# Patient Record
Sex: MALE | Race: WHITE | HISPANIC OR LATINO | Employment: UNEMPLOYED | URBAN - METROPOLITAN AREA
[De-identification: names, ages, dates, MRNs, and addresses within clinical notes are randomized per-mention and may not be internally consistent; named-entity substitution may affect disease eponyms.]

---
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
## 2024-01-25 ENCOUNTER — NURSE TRIAGE (OUTPATIENT)
Dept: OTHER | Facility: OTHER | Age: 19
End: 2024-01-25

## 2024-01-25 NOTE — TELEPHONE ENCOUNTER
"Were you within 6 feet or less, for up to 15 minutes or more with a person that has a confirmed COVID-19 test? Positive today  What was the date of your exposure? No exposure  Are you experiencing any symptoms attributed to the virus?  (Assess for SOB, cough, fever, difficulty breathing) Fatigue, cough, chest congestion , no fever.  HIGH RISK: Do you have any history heart or lung conditions, weakened immune system, diabetes, Asthma, CHF, HIV, COPD, Chemo, renal failure, sickle cell, etc? None  VACCINE: \"Have you gotten the COVID-19 vaccine?\" If Yes ask: \"Which one, how many shots, when did you get it?\" 3  immunizations.  Reason for Disposition  • [1] COVID-19 diagnosed by positive lab test (e.g., PCR, rapid self-test kit) AND [2] mild symptoms (e.g., cough, fever, others) AND [3] no complications or SOB    Protocols used: Coronavirus (COVID-19) Diagnosed or Suspected-ADULT-    "

## 2024-01-25 NOTE — TELEPHONE ENCOUNTER
"Regarding: congestion / cough / covid+  ----- Message from Meghan Jones sent at 1/25/2024  6:24 PM EST -----  \"My son has congestion, headache, earache, a cough hand stomach pains. He also just tested COVID+\"    "

## 2024-02-09 ENCOUNTER — OFFICE VISIT (OUTPATIENT)
Dept: FAMILY MEDICINE CLINIC | Facility: CLINIC | Age: 19
End: 2024-02-09
Payer: COMMERCIAL

## 2024-02-09 ENCOUNTER — HOSPITAL ENCOUNTER (EMERGENCY)
Facility: HOSPITAL | Age: 19
Discharge: HOME | End: 2024-02-09
Attending: EMERGENCY MEDICINE
Payer: COMMERCIAL

## 2024-02-09 VITALS
HEART RATE: 81 BPM | TEMPERATURE: 98.1 F | RESPIRATION RATE: 18 BRPM | DIASTOLIC BLOOD PRESSURE: 55 MMHG | SYSTOLIC BLOOD PRESSURE: 113 MMHG | OXYGEN SATURATION: 100 %

## 2024-02-09 VITALS
HEIGHT: 66 IN | OXYGEN SATURATION: 97 % | WEIGHT: 185.2 LBS | HEART RATE: 75 BPM | SYSTOLIC BLOOD PRESSURE: 110 MMHG | DIASTOLIC BLOOD PRESSURE: 82 MMHG | BODY MASS INDEX: 29.77 KG/M2 | TEMPERATURE: 97.8 F

## 2024-02-09 DIAGNOSIS — R55 VASOVAGAL SYNCOPE: Primary | ICD-10-CM

## 2024-02-09 LAB
ALBUMIN SERPL-MCNC: 4.7 G/DL (ref 3.6–5.1)
ALBUMIN/GLOB SERPL: 1.5 (CALC) (ref 1–2.5)
ALP SERPL-CCNC: 86 U/L (ref 46–169)
ALT SERPL-CCNC: 23 U/L (ref 8–46)
AST SERPL-CCNC: 33 U/L (ref 12–32)
ATRIAL RATE: 64 BPM
BASOPHILS # BLD AUTO: 37 CELLS/UL (ref 0–200)
BASOPHILS NFR BLD AUTO: 0.4 %
BILIRUB SERPL-MCNC: 1.4 MG/DL (ref 0.2–1.1)
BUN SERPL-MCNC: 21 MG/DL (ref 7–20)
BUN/CREAT SERPL: 25 (CALC) (ref 6–22)
CALCIUM SERPL-MCNC: 9.9 MG/DL (ref 8.9–10.4)
CHLORIDE SERPL-SCNC: 101 MMOL/L (ref 98–110)
CO2 SERPL-SCNC: 26 MMOL/L (ref 20–32)
CREAT SERPL-MCNC: 0.85 MG/DL (ref 0.6–1.24)
EOSINOPHIL # BLD AUTO: 28 CELLS/UL (ref 15–500)
EOSINOPHIL NFR BLD AUTO: 0.3 %
ERYTHROCYTE [DISTWIDTH] IN BLOOD BY AUTOMATED COUNT: 12.7 % (ref 11–15)
GFR/BSA.PRED SERPLBLD CYS-BASED-ARV: 128 ML/MIN/1.73M2
GLOBULIN SER CALC-MCNC: 3.2 G/DL (CALC) (ref 2.1–3.5)
GLUCOSE SERPL-MCNC: 89 MG/DL (ref 65–99)
HCT VFR BLD AUTO: 39.9 % (ref 38.5–50)
HGB BLD-MCNC: 13.8 G/DL (ref 13.2–17.1)
LYMPHOCYTES # BLD AUTO: 2769 CELLS/UL (ref 850–3900)
LYMPHOCYTES NFR BLD AUTO: 30.1 %
MCH RBC QN AUTO: 30.3 PG (ref 27–33)
MCHC RBC AUTO-ENTMCNC: 34.6 G/DL (ref 32–36)
MCV RBC AUTO: 87.5 FL (ref 80–100)
MONOCYTES # BLD AUTO: 607 CELLS/UL (ref 200–950)
MONOCYTES NFR BLD AUTO: 6.6 %
NEUTROPHILS # BLD AUTO: 5759 CELLS/UL (ref 1500–7800)
NEUTROPHILS NFR BLD AUTO: 62.6 %
P AXIS: 17 DEGREES
PLATELET # BLD AUTO: 276 THOUSAND/UL (ref 140–400)
PMV BLD REES-ECKER: 10.9 FL (ref 7.5–12.5)
POTASSIUM SERPL-SCNC: 4.9 MMOL/L (ref 3.8–5.1)
PR INTERVAL: 166 MS
PROT SERPL-MCNC: 7.9 G/DL (ref 6.3–8.2)
QRS AXIS: 43 DEGREES
QRSD INTERVAL: 82 MS
QT INTERVAL: 392 MS
QTC INTERVAL: 404 MS
RBC # BLD AUTO: 4.56 MILLION/UL (ref 4.2–5.8)
SODIUM SERPL-SCNC: 136 MMOL/L (ref 135–146)
T WAVE AXIS: 27 DEGREES
VENTRICULAR RATE: 64 BPM
WBC # BLD AUTO: 9.2 THOUSAND/UL (ref 3.8–10.8)

## 2024-02-09 PROCEDURE — 93005 ELECTROCARDIOGRAM TRACING: CPT

## 2024-02-09 PROCEDURE — 99213 OFFICE O/P EST LOW 20 MIN: CPT | Performed by: FAMILY MEDICINE

## 2024-02-09 PROCEDURE — 99283 EMERGENCY DEPT VISIT LOW MDM: CPT

## 2024-02-09 PROCEDURE — 99284 EMERGENCY DEPT VISIT MOD MDM: CPT | Performed by: EMERGENCY MEDICINE

## 2024-02-09 NOTE — ED ATTENDING ATTESTATION
2/9/2024  I, Joseph Pacheco MD, saw and evaluated the patient. I have discussed the patient with the resident/non-physician practitioner and agree with the resident's/non-physician practitioner's findings, Plan of Care, and MDM as documented in the resident's/non-physician practitioner's note, except where noted. All available labs and Radiology studies were reviewed.  I was present for key portions of any procedure(s) performed by the resident/non-physician practitioner and I was immediately available to provide assistance.       At this point I agree with the current assessment done in the Emergency Department.  I have conducted an independent evaluation of this patient a history and physical is as follows:    ED Course     Emergency Department Note- Dawson Mckay 18 y.o. male MRN: [id number]    Unit/Bed#: ED 23 Encounter: [id number]    Dawson Mckay is a 18 y.o. male who presents with   Chief Complaint   Patient presents with    Syncope     Patient reports syncopal episode that lasted approx 30 sec, patient states he still feels dizzy but not as bad.  Father reports patient hitting head on kitchen table, denies headache at this time         History of Present Illness   HPI:  Dawson Mckay is a 18 y.o. male who presents for evaluation of:  Syncopal episode that lasted for about 30 seconds at home.  The patient notes that he woke up abruptly to check on his father who was having a coughing episode.  The patient fell hitting his head on the kitchen table.  He does not take any anticoagulation or antiplatelet medications.  He denies headache, nausea, vomiting.  He has no history of seizure or neurologic disease or of cardiac disease.  He denies any associated chest pain and palpitations.    Review of Systems   Constitutional:  Negative for fatigue and fever.   HENT:  Negative for congestion and sore throat.    Respiratory:  Negative for cough and shortness of breath.    Cardiovascular:  Negative  for chest pain and palpitations.   Gastrointestinal:  Negative for abdominal pain and nausea.   Genitourinary:  Negative for flank pain and frequency.   Neurological:  Positive for syncope. Negative for light-headedness and headaches.   Psychiatric/Behavioral:  Negative for dysphoric mood and hallucinations.    All other systems reviewed and are negative.      Historical Information   Past Medical History:   Diagnosis Date    GERD (gastroesophageal reflux disease)      History reviewed. No pertinent surgical history.  Social History   Social History     Substance and Sexual Activity   Alcohol Use No     Social History     Substance and Sexual Activity   Drug Use No     Social History     Tobacco Use   Smoking Status Never   Smokeless Tobacco Never     Family History:   Family History   Problem Relation Age of Onset    Hypothyroidism Father     Asthma Family        Meds/Allergies   PTA meds:   None     Allergies   Allergen Reactions    Antihistamines, Chlorpheniramine-Type Rash     Patient can take benadryl        Objective   First Vitals:   Blood Pressure: 130/74 (02/09/24 0406)  Pulse: 76 (02/09/24 0406)  Temperature: 98.1 °F (36.7 °C) (02/09/24 0406)  Temp Source: Oral (02/09/24 0406)  Respirations: 18 (02/09/24 0406)  SpO2: 100 % (02/09/24 0406)    Current Vitals:   Blood Pressure: 130/74 (02/09/24 0406)  Pulse: 76 (02/09/24 0406)  Temperature: 98.1 °F (36.7 °C) (02/09/24 0406)  Temp Source: Oral (02/09/24 0406)  Respirations: 18 (02/09/24 0406)  SpO2: 100 % (02/09/24 0406)    No intake or output data in the 24 hours ending 02/09/24 0456    Invasive Devices       None                   Physical Exam  Vitals and nursing note reviewed.   Constitutional:       General: He is not in acute distress.     Appearance: Normal appearance. He is well-developed.   HENT:      Head: Normocephalic and atraumatic.      Right Ear: External ear normal.      Left Ear: External ear normal.      Nose: Nose normal.      Mouth/Throat:  "     Pharynx: No oropharyngeal exudate.   Eyes:      Conjunctiva/sclera: Conjunctivae normal.      Pupils: Pupils are equal, round, and reactive to light.   Cardiovascular:      Rate and Rhythm: Normal rate and regular rhythm.   Pulmonary:      Effort: Pulmonary effort is normal. No respiratory distress.   Abdominal:      General: Abdomen is flat. There is no distension.      Palpations: Abdomen is soft.   Musculoskeletal:         General: No deformity. Normal range of motion.      Cervical back: Normal range of motion and neck supple.   Skin:     General: Skin is warm and dry.      Capillary Refill: Capillary refill takes less than 2 seconds.   Neurological:      General: No focal deficit present.      Mental Status: He is alert and oriented to person, place, and time. Mental status is at baseline.      Coordination: Coordination normal.   Psychiatric:         Mood and Affect: Mood normal.         Behavior: Behavior normal.         Thought Content: Thought content normal.         Judgment: Judgment normal.           Medical Decision Makin.  Neurocardiogenic syncope: ECG rule out arrhythmia; follow-up with PCP as an outpatient.    Recent Results (from the past 36 hour(s))   ECG 12 lead    Collection Time: 24  4:19 AM   Result Value Ref Range    Ventricular Rate 64 BPM    Atrial Rate 64 BPM    HI Interval 166 ms    QRSD Interval 82 ms    QT Interval 392 ms    QTC Interval 404 ms    P [place] 17 degrees    QRS Axis 43 degrees    T Wave Axis 27 degrees     No orders to display         Portions of the record may have been created with voice recognition software. Occasional wrong word or \"sound a like\" substitutions may have occurred due to the inherent limitations of voice recognition software.  Read the chart carefully and recognize, using context, where substitutions have occurred.        Critical Care Time  Procedures      "

## 2024-02-09 NOTE — PROGRESS NOTES
FAMILY PRACTICE OFFICE VISIT  Froy Henry D.O.    [facility]  [facility]  [address] [place] [address]  [address]  [place], [address]      NAME: Dawson Mckay  AGE: 18 y.o. SEX: male  : 2005   MRN: [id number]    DATE: 2024  TIME: 10:54 AM    Assessment and Plan     Problem List Items Addressed This Visit    None  Visit Diagnoses       Vasovagal syncope    -  Primary    Relevant Orders    CBC    Comprehensive metabolic panel            Patient Instructions   Had vasovagal episode overnight, witnessed syncope, struck occipital parietal.  Looks fine here today.  He will do CBC, CMP for completeness, did have blood work back in .  No prior issues with syncope.  EKG was run at emergency room, that was fine.    Does exercise routinely, has been watching his diet, has dropped about 35 to 40 pounds in the past year, make sure to keep hydrated, avoid extended fast.  Has been using creatine, I want him to stop that, avoid supplements at the gym.    If does note further issues he should call.    Chief Complaint     Chief Complaint   Patient presents with    Syncope       History of Present Illness   Dawson Mckay is a 18 y.o.-year-old male who is in accompanied by his dad for a recheck, we reviewed her emergency room visit from earlier this morning, had syncopal episode which was witnessed by his dad, no prior episodes.  Feels fine now      Review of Systems   Review of Systems   Constitutional:  Negative for appetite change, fatigue, fever and unexpected weight change (has lost wt w diet and exercise).   HENT:  Negative for sore throat and trouble swallowing.    Respiratory:  Negative for cough, chest tightness and shortness of breath.    Cardiovascular:  Negative for chest pain, palpitations and leg swelling.   Gastrointestinal:  Negative for abdominal pain, blood in stool, diarrhea, nausea and vomiting.        No acid reflux     No change in bowel   Genitourinary:   "Negative for dysuria and hematuria.   Neurological:  Positive for dizziness, syncope and light-headedness. Negative for headaches.   Hematological:  Does not bruise/bleed easily.   Psychiatric/Behavioral:  Negative for behavioral problems, confusion and sleep disturbance.        Active Problem List     Patient Active Problem List   Diagnosis    Attention and concentration deficit    Exercise-induced shortness of breath    Childhood obesity, BMI  percentile       Past Medical History:  Reviewed    Past Surgical History:  Reviewed    Family History:  Reviewed    Social History:  Reviewed    Objective     Vitals:    02/09/24 0848   BP: 110/82   BP Location: Left arm   Patient Position: Sitting   Cuff Size: Large   Pulse: 75   Temp: 97.8 °F (36.6 °C)   TempSrc: Tympanic   SpO2: 97%   Weight: 84 kg (185 lb 3.2 oz)   Height: 5' 6\" (1.676 m)     Body mass index is 29.89 kg/m².    BP Readings from Last 3 Encounters:   02/09/24 110/82   02/09/24 113/55   07/06/22 110/78 (29%, Z = -0.55 /  88%, Z = 1.17)*     *BP percentiles are based on the 2017 AAP Clinical Practice Guideline for boys       Wt Readings from Last 3 Encounters:   02/09/24 84 kg (185 lb 3.2 oz) (85%, Z= 1.05)*   07/06/22 102 kg (224 lb 6.4 oz) (98%, Z= 2.08)*   09/11/21 102 kg (225 lb 12 oz) (99%, Z= 2.22)*     * Growth percentiles are based on [facility] (Boys, 2-20 Years) data.       Physical Exam  Constitutional:       General: He is not in acute distress.     Appearance: Normal appearance. He is well-developed. He is not ill-appearing.   HENT:      Right Ear: Tympanic membrane normal.      Left Ear: Tympanic membrane normal.      Mouth/Throat:      Pharynx: Oropharynx is clear.   Eyes:      General: No scleral icterus.  Cardiovascular:      Rate and Rhythm: Normal rate and regular rhythm.      Heart sounds: Normal heart sounds. No murmur heard.  Pulmonary:      Effort: Pulmonary effort is normal. No respiratory distress.      Breath sounds: Normal breath " sounds. No wheezing, rhonchi or rales.   Abdominal:      Palpations: Abdomen is soft.      Tenderness: There is no abdominal tenderness.   Musculoskeletal:      Right lower leg: No edema.      Left lower leg: No edema.   Lymphadenopathy:      Cervical: No cervical adenopathy.   Neurological:      General: No focal deficit present.      Mental Status: He is alert and oriented to person, place, and time.   Psychiatric:         Mood and Affect: Mood normal.         Behavior: Behavior normal.         ALLERGIES:  Allergies   Allergen Reactions    Antihistamines, Chlorpheniramine-Type Rash     Patient can take benadryl        Current Medications     No current outpatient medications on file.     No current facility-administered medications for this visit.            Orders Placed This Encounter   Procedures    CBC    Comprehensive metabolic panel         Froy Henry DO

## 2024-02-09 NOTE — DISCHARGE INSTRUCTIONS
Please return to the emergency department if you develop new or worsening symptoms including fever, chest pain, shortness of breath, nausea and vomiting that does not resolve on its own, change in/ loss of vision, new onset worsening headache.    Please follow-up with your primary care provider for additional care and management of your recent syncopal event, please continue to follow w/ your family doctor for basic laboratory work regarding questions of nutrition status secondary to dieting.    Please continue to take your home medications as prescribed, please continue to hydrate well.

## 2024-02-09 NOTE — PATIENT INSTRUCTIONS
Had vasovagal episode overnight, witnessed syncope, struck occipital parietal.  Looks fine here today.  He will do CBC, CMP for completeness, did have blood work back in 2022.  No prior issues with syncope.  EKG was run at emergency room, that was fine.    Does exercise routinely, has been watching his diet, has dropped about 35 to 40 pounds in the past year, make sure to keep hydrated, avoid extended fast.  Has been using creatine, I want him to stop that, avoid supplements at the gym.    If does note further issues he should call.

## 2024-02-09 NOTE — ED PROVIDER NOTES
History  Chief Complaint   Patient presents with    Syncope     Patient reports syncopal episode that lasted approx 30 sec, patient states he still feels dizzy but not as bad.  Father reports patient hitting head on kitchen table, denies headache at this time     Patient is a 18-year-old male with no pertinent past medical history who presents emergency department for a syncopal episode while at home.  Patient reports that he awoke shortly before arrival due to his father having a acute coughing fit, rapidly got out of bed to check on his father, after checking on his father began to feel a flushed/warm sensation, felt his heart racing, went to the kitchen to get some water, while in the kitchen he passed out, struck his head, father was in the room, saw the event, patient was passed out for about 30 seconds, stated that he may be shook while he was on the floor, after about 30 seconds he came to, asked where he was most going on, and during this interval the father took her blood pressure, was low approximately 70/50, due to this concern they decided to come to the emergency department.     Patient is reporting no blood thinners, no history of cardiac illness, no family history of epilepsy, no recent fevers, no viral symptoms, no chest pain, no shortness of breathing, no loss of vision, no new onset weakness or change in sensation, ambulatory after the event, no history of prior events like this, no nausea, no vomiting.        None       Past Medical History:   Diagnosis Date    GERD (gastroesophageal reflux disease)        History reviewed. No pertinent surgical history.    Family History   Problem Relation Age of Onset    Hypothyroidism Father     Asthma Family      I have reviewed and agree with the history as documented.    E-Cigarette/Vaping    E-Cigarette Use Never User      E-Cigarette/Vaping Substances     Social History     Tobacco Use    Smoking status: Never    Smokeless tobacco: Never   Vaping Use     Vaping status: Never Used   Substance Use Topics    Alcohol use: No    Drug use: No        Review of Systems    Physical Exam  ED Triage Vitals   Temperature Pulse Respirations Blood Pressure SpO2   02/09/24 0406 02/09/24 0406 02/09/24 0406 02/09/24 0406 02/09/24 0406   98.1 °F (36.7 °C) 76 18 130/74 100 %      Temp Source Heart Rate Source Patient Position - Orthostatic VS BP Location FiO2 (%)   02/09/24 0406 02/09/24 0406 02/09/24 0458 02/09/24 0458 --   Oral Monitor Standing - Orthostatic VS Left arm       Pain Score       02/09/24 0406       4             Orthostatic Vital Signs  Vitals:    02/09/24 0406 02/09/24 0458   BP: 130/74 113/55   Pulse: 76 81   Patient Position - Orthostatic VS:  Standing - Orthostatic VS       Physical Exam  Vitals and nursing note reviewed.   Constitutional:       General: He is not in acute distress.     Appearance: He is well-developed. He is not ill-appearing or toxic-appearing.   HENT:      Head: Normocephalic and atraumatic.   Eyes:      Extraocular Movements: Extraocular movements intact.      Conjunctiva/sclera: Conjunctivae normal.      Pupils: Pupils are equal, round, and reactive to light.   Cardiovascular:      Rate and Rhythm: Normal rate and regular rhythm.      Heart sounds: No murmur heard.  Pulmonary:      Effort: Pulmonary effort is normal. No respiratory distress.      Breath sounds: Normal breath sounds. No wheezing, rhonchi or rales.   Abdominal:      Palpations: Abdomen is soft.      Tenderness: There is no abdominal tenderness. There is no guarding or rebound.   Musculoskeletal:         General: No swelling, tenderness, deformity or signs of injury.      Cervical back: Neck supple.   Skin:     General: Skin is warm and dry.      Capillary Refill: Capillary refill takes less than 2 seconds.      Findings: No bruising, erythema, lesion or rash.      Comments: There is a small scalp hematoma over the occipital region, no evidence of open wound, no bleeding, minimal  if any tenderness to palpation   Neurological:      General: No focal deficit present.      Mental Status: He is alert and oriented to person, place, and time.      Sensory: No sensory deficit.   Psychiatric:         Mood and Affect: Mood normal.         ED Medications  Medications - No data to display    Diagnostic Studies  Results Reviewed       None                   No orders to display         Procedures  Procedures      ED Course                                       Medical Decision Making  Patient is a 18 y.o. male with PMH of nothing pertinent who presents to the ED with complaint of recent syncopal episode.    Vital signs on arrival within normal limits. On exam patient is seen in the bed, alert, oriented, speaking without difficulty, no focal neurological deficits, cardiopulmonary auscultation within normal is, abdomen is soft/nontender, breathing without difficulty, no pain complaint.    History and physical exam most consistent with neurocardiogenic syncope. However, differential diagnosis included but not limited to dehydration, arrhythmia. Plan EKG, oral rehydration, orthostatic vital signs.    View ED course above for further discussion on patient workup.     Patient given and juice/water, tolerating p.o. liquids and food.    All labs reviewed and utilized in the medical decision making process  All radiology studies independently viewed by me and interpreted by the radiologist.  I reviewed all testing with the patient.     Upon re-evaluation patient is seen in the room, alert, oriented, no additional symptoms/complaint, has continued to remain hemodynamically stable, orthostatic vital signs within normal limits, no drop in patient's blood pressure.     Plan for care discussed with pt, acknowledges plan for care, consents to plan for care, educated on symptoms concerning for return to the emergency department, feels safe to return home at this time, cleared for  discharge.            Disposition  Final diagnoses:   Vasovagal syncope     Time reflects when diagnosis was documented in both MDM as applicable and the Disposition within this note       Time User Action Codes Description Comment    2/9/2024  4:53 AM Clifton Garnica Add [R55] Vasovagal syncope           ED Disposition       ED Disposition   Discharge    Condition   Stable    Date/Time   Fri Feb 9, 2024  4:55 AM    Comment   Dawson Mckay discharge to home/self care.                   Follow-up Information       Follow up With Specialties Details Why Contact Yunior Del Rio DO Family Medicine Schedule an appointment as soon as possible for a visit in 1 week  [address] [place] [address]  [address]  [facility] [address] [phone]              There are no discharge medications for this patient.    No discharge procedures on file.    PDMP Review       None             ED Provider  Attending physically available and evaluated Dawson TAMIKO Mckay. I managed the patient along with the ED Attending.    Electronically Signed by           Clifton Garnica DO  02/09/24 0532       Clifton Garnica DO  02/09/24 0533

## 2024-11-18 ENCOUNTER — APPOINTMENT (OUTPATIENT)
Dept: RADIOLOGY | Facility: MEDICAL CENTER | Age: 19
End: 2024-11-18
Payer: OTHER MISCELLANEOUS

## 2024-11-18 ENCOUNTER — APPOINTMENT (OUTPATIENT)
Dept: URGENT CARE | Facility: MEDICAL CENTER | Age: 19
End: 2024-11-18
Payer: OTHER MISCELLANEOUS

## 2024-11-18 DIAGNOSIS — S89.92XA INJURY OF LEFT LOWER LEG, INITIAL ENCOUNTER: Primary | ICD-10-CM

## 2024-11-18 DIAGNOSIS — S89.92XA INJURY OF LEFT LOWER LEG, INITIAL ENCOUNTER: ICD-10-CM

## 2024-11-18 PROCEDURE — 99283 EMERGENCY DEPT VISIT LOW MDM: CPT

## 2024-11-18 PROCEDURE — 73590 X-RAY EXAM OF LOWER LEG: CPT

## 2024-11-18 PROCEDURE — G0382 LEV 3 HOSP TYPE B ED VISIT: HCPCS

## 2024-11-25 ENCOUNTER — APPOINTMENT (OUTPATIENT)
Dept: URGENT CARE | Facility: MEDICAL CENTER | Age: 19
End: 2024-11-25
Payer: OTHER MISCELLANEOUS

## 2024-11-25 PROCEDURE — 99213 OFFICE O/P EST LOW 20 MIN: CPT | Performed by: PHYSICIAN ASSISTANT

## 2025-05-26 ENCOUNTER — HOSPITAL ENCOUNTER (EMERGENCY)
Facility: HOSPITAL | Age: 20
Discharge: HOME | End: 2025-05-26
Attending: EMERGENCY MEDICINE | Admitting: EMERGENCY MEDICINE
Payer: COMMERCIAL

## 2025-05-26 ENCOUNTER — APPOINTMENT (EMERGENCY)
Dept: RADIOLOGY | Facility: HOSPITAL | Age: 20
End: 2025-05-26
Payer: COMMERCIAL

## 2025-05-26 VITALS
DIASTOLIC BLOOD PRESSURE: 77 MMHG | HEART RATE: 66 BPM | WEIGHT: 182.76 LBS | OXYGEN SATURATION: 100 % | SYSTOLIC BLOOD PRESSURE: 130 MMHG | RESPIRATION RATE: 18 BRPM | BODY MASS INDEX: 29.5 KG/M2

## 2025-05-26 DIAGNOSIS — S63.289A CLOSED TRAUMATIC DISLOCATION OF PROXIMAL INTERPHALANGEAL (PIP) JOINT OF FINGER OF RIGHT HAND: Primary | ICD-10-CM

## 2025-05-26 PROCEDURE — 26770 TREAT FINGER DISLOCATION: CPT

## 2025-05-26 PROCEDURE — 73140 X-RAY EXAM OF FINGER(S): CPT

## 2025-05-26 PROCEDURE — 99284 EMERGENCY DEPT VISIT MOD MDM: CPT

## 2025-05-26 PROCEDURE — 99283 EMERGENCY DEPT VISIT LOW MDM: CPT

## 2025-05-26 RX ORDER — LIDOCAINE HYDROCHLORIDE 10 MG/ML
5 INJECTION, SOLUTION EPIDURAL; INFILTRATION; INTRACAUDAL; PERINEURAL ONCE
Status: COMPLETED | OUTPATIENT
Start: 2025-05-26 | End: 2025-05-26

## 2025-05-26 RX ORDER — IBUPROFEN 400 MG/1
800 TABLET, FILM COATED ORAL ONCE
Status: COMPLETED | OUTPATIENT
Start: 2025-05-26 | End: 2025-05-26

## 2025-05-26 RX ADMIN — IBUPROFEN 800 MG: 400 TABLET, FILM COATED ORAL at 21:15

## 2025-05-26 RX ADMIN — LIDOCAINE HYDROCHLORIDE 5 ML: 10 INJECTION, SOLUTION EPIDURAL; INFILTRATION; INTRACAUDAL at 22:04

## 2025-05-27 NOTE — DISCHARGE INSTRUCTIONS
You had a dislocation of your pinky finger requiring reduction.  There is back in anatomic alignment.  I recommend that you keep aluminum splint in place for immobilization and follow-up with hand surgery to ensure your range of motion and other symptoms are improving.

## 2025-05-27 NOTE — ED PROVIDER NOTES
Time reflects when diagnosis was documented in both MDM as applicable and the Disposition within this note       Time User Action Codes Description Comment    5/26/2025 10:54 PM Juarez Lui Add [S63.289A] Closed traumatic dislocation of proximal interphalangeal (PIP) joint of finger of right hand           ED Disposition       ED Disposition   Discharge    Condition   Stable    Date/Time   Mon May 26, 2025 10:53 PM    Comment   Dawson Mckay discharge to home/self care.                   Assessment & Plan       Medical Decision Making  19-year-old male presents with injury to his right fifth finger that occurred a couple hours ago playing football.  Exam: Right fifth digit appears laterally displaced around the level of the PIP.  PIP dislocation confirmed with x-ray.  After discussion with patient regarding management, digital block was performed with lidocaine without epinephrine.  Subsequently performed reduction with successful anatomic realignment of middle and distal phalanx.  On postreduction x-ray confirms successful reduction, and also shows mild avulsion fracture involving the anterior finger around level of proximal middle phalanx.  On reevaluation patient feels improvement in his symptoms.  His finger now is able to flex and extend normally, active and passive, ROM just diminished due to swelling.  Cap refill less than 2 seconds.  Sensation decreased due to digital block.  Placed in aluminum splint.  Given referral to hand surgery for follow-up.  Patient expresses understanding of the condition, treatment plan, follow-up instructions, and return precautions.      Amount and/or Complexity of Data Reviewed  Radiology: ordered and independent interpretation performed.    Risk  Prescription drug management.             Medications   ibuprofen (MOTRIN) tablet 800 mg (800 mg Oral Given 5/26/25 2115)   lidocaine (PF) (XYLOCAINE-MPF) 1 % injection 5 mL (5 mL Infiltration Given by Other 5/26/25 2204)  "      ED Risk Strat Scores                    No data recorded                            History of Present Illness       Chief Complaint   Patient presents with    Finger Injury     Pt reports \"dislocated right pinky during a football game\" no meds pta.       Past Medical History[1]   Past Surgical History[2]   Family History[3]   Social History[4]   E-Cigarette/Vaping    E-Cigarette Use Never User       E-Cigarette/Vaping Substances      I have reviewed and agree with the history as documented.     19-year-old male presents with injury to his right fifth digit while playing football couple hours ago.  Significantly diminished ROM of affected finger as well as pain.  Sensation grossly intact.        Review of Systems   Constitutional:  Negative for chills and fever.   HENT:  Negative for ear pain and sore throat.    Eyes:  Negative for pain and visual disturbance.   Respiratory:  Negative for cough and shortness of breath.    Cardiovascular:  Negative for chest pain and palpitations.   Gastrointestinal:  Negative for abdominal pain and vomiting.   Genitourinary:  Negative for dysuria and hematuria.   Musculoskeletal:  Positive for arthralgias and joint swelling. Negative for back pain.   Skin:  Negative for color change and rash.   Neurological:  Negative for seizures and syncope.   All other systems reviewed and are negative.          Objective       ED Triage Vitals [05/26/25 2047]   Temp Pulse Blood Pressure Respirations SpO2 Patient Position - Orthostatic VS   -- 66 130/77 18 100 % Sitting      Temp src Heart Rate Source BP Location FiO2 (%) Pain Score    -- Monitor Left arm -- 8      Vitals      Date and Time Temp Pulse SpO2 Resp BP Pain Score FACES Pain Rating User   05/26/25 2303 -- -- -- -- -- 5 -- SH   05/26/25 2115 -- -- -- -- -- 9 -- AA   05/26/25 2047 -- 66 100 % 18 130/77 8 -- NM            Physical Exam  Vitals and nursing note reviewed.   Constitutional:       General: He is not in acute distress.   "   Appearance: He is well-developed.   HENT:      Head: Normocephalic and atraumatic.     Eyes:      Conjunctiva/sclera: Conjunctivae normal.       Cardiovascular:      Rate and Rhythm: Normal rate and regular rhythm.      Heart sounds: No murmur heard.  Pulmonary:      Effort: Pulmonary effort is normal. No respiratory distress.      Breath sounds: Normal breath sounds.   Abdominal:      Palpations: Abdomen is soft.      Tenderness: There is no abdominal tenderness.     Musculoskeletal:         General: No swelling.        Hands:       Cervical back: Neck supple.     Skin:     General: Skin is warm and dry.      Capillary Refill: Capillary refill takes less than 2 seconds.     Neurological:      Mental Status: He is alert.     Psychiatric:         Mood and Affect: Mood normal.         Results Reviewed       None            XR finger fifth digit-pinkie RIGHT   ED Interpretation by Juarez Lui PA-C (05/26 2253)   ED wet read: Successful reduction of fifth digit PIP now with anatomic alignment.  Likely small avulsion fracture involving anterior proximal middle phalanx.      XR finger fifth digit-pinkie RIGHT   ED Interpretation by Juarez Lui PA-C (05/26 2149)   ED wet read: Dislocation of fifth digit PIP.  No associated fracture.          Orthopedic injury treatment    Date/Time: 5/26/2025 10:00 PM    Performed by: Juarez Lui PA-C  Authorized by: Juarez Lui PA-C    Patient Location:  ED    [place] Protocol:  procedure performed by consultantConsent: Verbal consent obtained  Risks and benefits: risks, benefits and alternatives were discussed  Consent given by: patient  Patient understanding: patient states understanding of the procedure being performed  Patient identity confirmed: verbally with patient    Injury location:  Finger  Location details:  Right little finger  Injury type:  Dislocation  Dislocation type: PIP    Neurovascular status: Neurovascularly intact    Distal  perfusion: normal    Neurological function: normal    Range of motion: reduced    Local anesthesia used?: Yes    General anesthesia used?: No    Anesthesia:  Digital block  Local anesthetic:  Lidocaine 1% without epinephrine  Anesthetic total (ml):  3  Manipulation performed?: Yes    Skeletal traction used?: Yes    Reduction successful?: Yes    Confirmation: Reduction confirmed by x-ray    Immobilization:  Splint  Splint type:  Finger splint, static  Supplies used:  Aluminum splint  Distal perfusion: normal    Neurological function: diminished    Neurological function comment:  S/p digital block  Range of motion: improved    Patient tolerance:  Patient tolerated the procedure well with no immediate complications      ED Medication and Procedure Management   None     There are no discharge medications for this patient.      ED SEPSIS DOCUMENTATION   Time reflects when diagnosis was documented in both MDM as applicable and the Disposition within this note       Time User Action Codes Description Comment    5/26/2025 10:54 PM Juarez Lui Add [S63.289A] Closed traumatic dislocation of proximal interphalangeal (PIP) joint of finger of right hand                      [1]   Past Medical History:  Diagnosis Date    GERD (gastroesophageal reflux disease)    [2] No past surgical history on file.  [3]   Family History  Problem Relation Name Age of Onset    Hypothyroidism Father      Asthma Family     [4]   Social History  Tobacco Use    Smoking status: Never    Smokeless tobacco: Never   Vaping Use    Vaping status: Never Used   Substance Use Topics    Alcohol use: No    Drug use: No        Juarez Lui PA-C  05/27/25 0647

## 2025-05-27 NOTE — ED NOTES
Splint applied by provider, KALEB Lui, at this time. Provider also educated pt on use.     Alexandre Dias RN  05/26/25 3512

## 2025-07-30 ENCOUNTER — TELEPHONE (OUTPATIENT)
Age: 20
End: 2025-07-30

## 2025-07-31 ENCOUNTER — APPOINTMENT (OUTPATIENT)
Dept: URGENT CARE | Facility: MEDICAL CENTER | Age: 20
End: 2025-07-31

## 2025-08-08 ENCOUNTER — APPOINTMENT (OUTPATIENT)
Dept: URGENT CARE | Facility: MEDICAL CENTER | Age: 20
End: 2025-08-08